# Patient Record
Sex: FEMALE | Race: WHITE | Employment: OTHER | URBAN - METROPOLITAN AREA
[De-identification: names, ages, dates, MRNs, and addresses within clinical notes are randomized per-mention and may not be internally consistent; named-entity substitution may affect disease eponyms.]

---

## 2017-03-28 ENCOUNTER — OFFICE VISIT (OUTPATIENT)
Dept: CARDIOLOGY CLINIC | Age: 77
End: 2017-03-28

## 2017-03-28 VITALS
DIASTOLIC BLOOD PRESSURE: 80 MMHG | OXYGEN SATURATION: 97 % | SYSTOLIC BLOOD PRESSURE: 130 MMHG | BODY MASS INDEX: 33.97 KG/M2 | HEIGHT: 64 IN | HEART RATE: 80 BPM | WEIGHT: 199 LBS

## 2017-03-28 DIAGNOSIS — I10 ESSENTIAL HYPERTENSION: ICD-10-CM

## 2017-03-28 DIAGNOSIS — R94.39 ABNORMAL STRESS TEST: ICD-10-CM

## 2017-03-28 DIAGNOSIS — I25.10 CORONARY ARTERY DISEASE INVOLVING NATIVE CORONARY ARTERY OF NATIVE HEART WITHOUT ANGINA PECTORIS: Primary | ICD-10-CM

## 2017-03-28 DIAGNOSIS — R06.02 SHORTNESS OF BREATH: ICD-10-CM

## 2017-03-28 DIAGNOSIS — E78.00 PURE HYPERCHOLESTEROLEMIA: ICD-10-CM

## 2017-03-28 PROCEDURE — 99215 OFFICE O/P EST HI 40 MIN: CPT | Performed by: INTERNAL MEDICINE

## 2017-03-28 ASSESSMENT — ENCOUNTER SYMPTOMS
WHEEZING: 0
COUGH: 1
COLOR CHANGE: 0
ABDOMINAL PAIN: 0
BLOOD IN STOOL: 0
EYE REDNESS: 0
SHORTNESS OF BREATH: 0
EYE PAIN: 0
CHEST TIGHTNESS: 0

## 2017-04-10 ENCOUNTER — HOSPITAL ENCOUNTER (OUTPATIENT)
Dept: NON INVASIVE DIAGNOSTICS | Age: 77
Discharge: HOME | End: 2017-04-10
Attending: INTERNAL MEDICINE | Admitting: INTERNAL MEDICINE

## 2017-04-10 ENCOUNTER — TELEPHONE (OUTPATIENT)
Dept: CARDIOLOGY CLINIC | Age: 77
End: 2017-04-10

## 2017-04-10 DIAGNOSIS — I10 ESSENTIAL HYPERTENSION: ICD-10-CM

## 2017-04-10 DIAGNOSIS — R94.39 ABNORMAL STRESS TEST: ICD-10-CM

## 2017-04-10 DIAGNOSIS — I25.10 CORONARY ARTERY DISEASE INVOLVING NATIVE CORONARY ARTERY OF NATIVE HEART WITHOUT ANGINA PECTORIS: ICD-10-CM

## 2017-04-10 DIAGNOSIS — R94.39 ABNORMAL STRESS TEST: Primary | ICD-10-CM

## 2017-04-10 DIAGNOSIS — R06.02 SHORTNESS OF BREATH: ICD-10-CM

## 2017-04-10 LAB
ANION GAP SERPL CALCULATED.3IONS-SCNC: 17 MMOL/L (ref 3–16)
BUN BLDV-MCNC: 9 MG/DL (ref 7–20)
CALCIUM SERPL-MCNC: 10.2 MG/DL (ref 8.3–10.6)
CHLORIDE BLD-SCNC: 88 MMOL/L (ref 99–110)
CO2: 26 MMOL/L (ref 21–32)
CREAT SERPL-MCNC: 0.8 MG/DL (ref 0.6–1.2)
GFR AFRICAN AMERICAN: >60
GFR NON-AFRICAN AMERICAN: >60
GLUCOSE BLD-MCNC: 88 MG/DL (ref 70–99)
HCT VFR BLD CALC: 38.6 % (ref 36–48)
HEMOGLOBIN: 12.7 G/DL (ref 12–16)
INR BLD: 0.96 (ref 0.85–1.15)
LV EF: 63 %
LVEF MODALITY: NORMAL
MCH RBC QN AUTO: 29.1 PG (ref 26–34)
MCHC RBC AUTO-ENTMCNC: 33 G/DL (ref 31–36)
MCV RBC AUTO: 88.2 FL (ref 80–100)
PDW BLD-RTO: 14.7 % (ref 12.4–15.4)
PLATELET # BLD: 293 K/UL (ref 135–450)
PMV BLD AUTO: 7.6 FL (ref 5–10.5)
POTASSIUM SERPL-SCNC: 4.5 MMOL/L (ref 3.5–5.1)
PROTHROMBIN TIME: 10.9 SEC (ref 9.6–13)
RBC # BLD: 4.37 M/UL (ref 4–5.2)
SODIUM BLD-SCNC: 131 MMOL/L (ref 136–145)
WBC # BLD: 12.2 K/UL (ref 4–11)

## 2017-04-11 RX ORDER — SODIUM CHLORIDE 0.9 % (FLUSH) 0.9 %
10 SYRINGE (ML) INJECTION PRN
Status: CANCELLED | OUTPATIENT
Start: 2017-04-12

## 2017-04-11 RX ORDER — SODIUM CHLORIDE 0.9 % (FLUSH) 0.9 %
10 SYRINGE (ML) INJECTION EVERY 12 HOURS SCHEDULED
Status: CANCELLED | OUTPATIENT
Start: 2017-04-12

## 2017-04-12 ENCOUNTER — HOSPITAL ENCOUNTER (OUTPATIENT)
Dept: CARDIAC CATH/INVASIVE PROCEDURES | Age: 77
Discharge: HOME | End: 2017-04-12
Attending: INTERNAL MEDICINE | Admitting: INTERNAL MEDICINE

## 2017-04-12 VITALS — HEIGHT: 64 IN | BODY MASS INDEX: 33.29 KG/M2 | WEIGHT: 195 LBS

## 2017-04-12 DIAGNOSIS — R06.02 SHORTNESS OF BREATH: ICD-10-CM

## 2017-04-12 RX ORDER — SODIUM CHLORIDE 0.9 % (FLUSH) 0.9 %
10 SYRINGE (ML) INJECTION PRN
Status: CANCELLED | OUTPATIENT
Start: 2017-04-12

## 2017-04-12 RX ORDER — SODIUM CHLORIDE 0.9 % (FLUSH) 0.9 %
10 SYRINGE (ML) INJECTION EVERY 12 HOURS SCHEDULED
Status: CANCELLED | OUTPATIENT
Start: 2017-04-12

## 2017-04-12 RX ORDER — ACETAMINOPHEN 325 MG/1
650 TABLET ORAL EVERY 4 HOURS PRN
Status: DISCONTINUED | OUTPATIENT
Start: 2017-04-12 | End: 2017-04-13 | Stop reason: HOSPADM

## 2017-04-12 RX ORDER — SODIUM CHLORIDE 9 MG/ML
INJECTION, SOLUTION INTRAVENOUS CONTINUOUS
Status: DISCONTINUED | OUTPATIENT
Start: 2017-04-12 | End: 2017-04-13 | Stop reason: HOSPADM

## 2017-04-12 RX ORDER — ONDANSETRON 2 MG/ML
4 INJECTION INTRAMUSCULAR; INTRAVENOUS EVERY 6 HOURS PRN
Status: DISCONTINUED | OUTPATIENT
Start: 2017-04-12 | End: 2017-04-13 | Stop reason: HOSPADM

## 2017-04-12 RX ORDER — ASPIRIN 325 MG
325 TABLET ORAL ONCE
Status: DISCONTINUED | OUTPATIENT
Start: 2017-04-12 | End: 2017-04-13 | Stop reason: HOSPADM

## 2017-04-19 LAB
EKG ATRIAL RATE: 83 BPM
EKG DIAGNOSIS: NORMAL
EKG P AXIS: 59 DEGREES
EKG P-R INTERVAL: 162 MS
EKG Q-T INTERVAL: 374 MS
EKG QRS DURATION: 82 MS
EKG QTC CALCULATION (BAZETT): 439 MS
EKG R AXIS: 56 DEGREES
EKG T AXIS: 17 DEGREES
EKG VENTRICULAR RATE: 83 BPM

## 2017-05-02 ENCOUNTER — OFFICE VISIT (OUTPATIENT)
Dept: CARDIOLOGY CLINIC | Age: 77
End: 2017-05-02

## 2017-05-02 VITALS
DIASTOLIC BLOOD PRESSURE: 88 MMHG | HEIGHT: 64 IN | BODY MASS INDEX: 33.97 KG/M2 | OXYGEN SATURATION: 98 % | SYSTOLIC BLOOD PRESSURE: 138 MMHG | HEART RATE: 92 BPM | WEIGHT: 199 LBS

## 2017-05-02 DIAGNOSIS — E78.00 PURE HYPERCHOLESTEROLEMIA: ICD-10-CM

## 2017-05-02 DIAGNOSIS — I10 ESSENTIAL HYPERTENSION: ICD-10-CM

## 2017-05-02 DIAGNOSIS — I25.10 CORONARY ARTERY DISEASE INVOLVING NATIVE CORONARY ARTERY OF NATIVE HEART WITHOUT ANGINA PECTORIS: Primary | ICD-10-CM

## 2017-05-02 PROCEDURE — 99214 OFFICE O/P EST MOD 30 MIN: CPT | Performed by: INTERNAL MEDICINE

## 2017-05-02 RX ORDER — EZETIMIBE AND SIMVASTATIN 10; 40 MG/1; MG/1
1 TABLET ORAL NIGHTLY
Qty: 90 TABLET | Refills: 3 | Status: SHIPPED | OUTPATIENT
Start: 2017-05-02 | End: 2017-12-14 | Stop reason: SDUPTHER

## 2017-05-02 RX ORDER — FLUTICASONE PROPIONATE 50 MCG
1 SPRAY, SUSPENSION (ML) NASAL PRN
COMMUNITY

## 2017-05-02 RX ORDER — LEVOCETIRIZINE DIHYDROCHLORIDE 5 MG/1
5 TABLET, FILM COATED ORAL NIGHTLY
COMMUNITY
End: 2018-05-31 | Stop reason: ALTCHOICE

## 2017-05-02 RX ORDER — MONTELUKAST SODIUM 10 MG/1
10 TABLET ORAL NIGHTLY
COMMUNITY
End: 2018-05-31 | Stop reason: ALTCHOICE

## 2017-05-02 ASSESSMENT — ENCOUNTER SYMPTOMS
BLOOD IN STOOL: 0
COLOR CHANGE: 0
COUGH: 0
WHEEZING: 0
ABDOMINAL PAIN: 0
CHEST TIGHTNESS: 0
EYE PAIN: 0
EYE REDNESS: 0
SHORTNESS OF BREATH: 0

## 2017-12-14 ENCOUNTER — OFFICE VISIT (OUTPATIENT)
Dept: CARDIOLOGY CLINIC | Age: 77
End: 2017-12-14

## 2017-12-14 VITALS
BODY MASS INDEX: 33.8 KG/M2 | HEART RATE: 90 BPM | DIASTOLIC BLOOD PRESSURE: 70 MMHG | HEIGHT: 64 IN | OXYGEN SATURATION: 97 % | SYSTOLIC BLOOD PRESSURE: 122 MMHG | WEIGHT: 198 LBS

## 2017-12-14 DIAGNOSIS — I10 ESSENTIAL HYPERTENSION: ICD-10-CM

## 2017-12-14 DIAGNOSIS — E78.00 PURE HYPERCHOLESTEROLEMIA: ICD-10-CM

## 2017-12-14 DIAGNOSIS — I25.10 CORONARY ARTERY DISEASE INVOLVING NATIVE CORONARY ARTERY OF NATIVE HEART WITHOUT ANGINA PECTORIS: Primary | ICD-10-CM

## 2017-12-14 DIAGNOSIS — E83.42 HYPOMAGNESEMIA: ICD-10-CM

## 2017-12-14 PROCEDURE — 1090F PRES/ABSN URINE INCON ASSESS: CPT | Performed by: INTERNAL MEDICINE

## 2017-12-14 PROCEDURE — G8427 DOCREV CUR MEDS BY ELIG CLIN: HCPCS | Performed by: INTERNAL MEDICINE

## 2017-12-14 PROCEDURE — 99214 OFFICE O/P EST MOD 30 MIN: CPT | Performed by: INTERNAL MEDICINE

## 2017-12-14 PROCEDURE — 1036F TOBACCO NON-USER: CPT | Performed by: INTERNAL MEDICINE

## 2017-12-14 PROCEDURE — 1123F ACP DISCUSS/DSCN MKR DOCD: CPT | Performed by: INTERNAL MEDICINE

## 2017-12-14 PROCEDURE — 4040F PNEUMOC VAC/ADMIN/RCVD: CPT | Performed by: INTERNAL MEDICINE

## 2017-12-14 PROCEDURE — 3017F COLORECTAL CA SCREEN DOC REV: CPT | Performed by: INTERNAL MEDICINE

## 2017-12-14 PROCEDURE — G8400 PT W/DXA NO RESULTS DOC: HCPCS | Performed by: INTERNAL MEDICINE

## 2017-12-14 PROCEDURE — G8598 ASA/ANTIPLAT THER USED: HCPCS | Performed by: INTERNAL MEDICINE

## 2017-12-14 PROCEDURE — G8484 FLU IMMUNIZE NO ADMIN: HCPCS | Performed by: INTERNAL MEDICINE

## 2017-12-14 PROCEDURE — G8417 CALC BMI ABV UP PARAM F/U: HCPCS | Performed by: INTERNAL MEDICINE

## 2017-12-14 RX ORDER — EZETIMIBE AND SIMVASTATIN 10; 40 MG/1; MG/1
1 TABLET ORAL NIGHTLY
Qty: 90 TABLET | Refills: 3 | Status: SHIPPED | OUTPATIENT
Start: 2017-12-14 | End: 2018-12-17

## 2017-12-14 ASSESSMENT — ENCOUNTER SYMPTOMS
BLOOD IN STOOL: 0
COLOR CHANGE: 0
EYE PAIN: 0
SHORTNESS OF BREATH: 0
CHEST TIGHTNESS: 0
EYE REDNESS: 0
ABDOMINAL PAIN: 0
COUGH: 0
WHEEZING: 0

## 2017-12-14 NOTE — PROGRESS NOTES
and vitals reviewed. Blood pressure 122/70, pulse 90, height 5' 4\" (1.626 m), weight 198 lb (89.8 kg), SpO2 97 %. Vitals:    12/14/17 1232   BP: 122/70   Pulse: 90   SpO2: 97%   Weight: 198 lb (89.8 kg)   Height: 5' 4\" (1.626 m)     Body mass index is 33.99 kg/m². Wt Readings from Last 3 Encounters:   12/14/17 198 lb (89.8 kg)   05/02/17 199 lb (90.3 kg)   04/12/17 195 lb (88.5 kg)     BP Readings from Last 3 Encounters:   12/14/17 122/70   05/02/17 138/88   03/28/17 130/80        Current Outpatient Prescriptions   Medication Sig Dispense Refill    montelukast (SINGULAIR) 10 MG tablet Take 10 mg by mouth nightly      fluticasone (FLONASE) 50 MCG/ACT nasal spray 1 spray by Nasal route daily      levocetirizine (XYZAL) 5 MG tablet Take 5 mg by mouth nightly      ezetimibe-simvastatin (VYTORIN) 10-40 MG per tablet Take 1 tablet by mouth nightly 90 tablet 3    aspirin 81 MG tablet Take 81 mg by mouth daily      budesonide (PULMICORT FLEXHALER) 180 MCG/ACT AEPB inhaler Inhale 2 puffs into the lungs as needed       albuterol sulfate HFA (VENTOLIN HFA) 108 (90 BASE) MCG/ACT inhaler Inhale 2 puffs into the lungs every 6 hours as needed for Wheezing      potassium chloride SA (K-DUR;KLOR-CON M) 10 MEQ tablet Take 3 tablets by mouth daily. 90 tablet 3    acyclovir (ZOVIRAX) 5 % ointment Apply  topically every 3 hours. Apply topically every 3 hours.  levothyroxine (SYNTHROID) 50 MCG tablet Take 75 mcg by mouth Daily       esomeprazole (NEXIUM) 40 MG capsule Take 40 mg by mouth every morning (before breakfast).  magnesium oxide (MAG-OX) 400 MG tablet Take 1 tablet by mouth 3 times daily. 90 tablet 6    triamterene-hydrochlorothiazide (MAXZIDE-25) 37.5-25 MG per tablet Take 1 tablet by mouth daily.  lorazepam (ATIVAN) 1 MG tablet Take 1 mg by mouth every 6 hours as needed.         olmesartan (BENICAR) 20 MG tablet Take 10 mg by mouth 2 times daily        No current facility-administered medications for this visit. Social History     Social History    Marital status:      Spouse name: N/A    Number of children: N/A    Years of education: N/A     Occupational History    retired      Social History Main Topics    Smoking status: Former Smoker     Packs/day: 1.50     Years: 25.00     Quit date: 10/24/1997    Smokeless tobacco: Never Used    Alcohol use No    Drug use: No    Sexual activity: Not Asked      Comment:      Other Topics Concern    None     Social History Narrative    None     Past Surgical History:   Procedure Laterality Date    APPENDECTOMY      CERVICAL POLYP REMOVAL      CORONARY ANGIOPLASTY WITH STENT PLACEMENT  2002    Prox Circ, PDA of RCA    TONSILLECTOMY      TOTAL KNEE ARTHROPLASTY Right May 2006    Dr. Brigida Gaines Left 9/14/15    Left knee     Allergies   Allergen Reactions    Atenolol     Ceclor [Cefaclor]     Ciprofloxacin Other (See Comments)     May have made her \"make foam\" from her lungs.  Clindamycin/Lincomycin     Diltiazem     Diovan [Valsartan]     Doxycycline Hives    Erythromycin     Estrogenic Substance Other (See Comments)     Made her bleed.  Lipitor     Mevacor [Lovastatin]     Prednisone     Questran     Sulfa Antibiotics     Valtrex [Valacyclovir Hcl]     Verapamil     Yellow Dyes (Non-Tartrazine) Other (See Comments)     Fast heart rate     Family History   Problem Relation Age of Onset    Arthritis Mother      Recent labs and imaging reviewed. Assessment:       CAD (coronary artery disease)- NOT ON BETA BLOCKER DUE TO DYSPNEA      s/p prox LCx and PDA of RCA stents 02. Nuc GXT 3/2017 high inferior wall ischemia, LVEF 76%. Cath 4/2017 CORONARY ANATOMY:  1. Left main trunk is patent. 2. The LAD stent in the mid LAD has mild in-stent restenosis about 30%. The  first diagonal branch, which is jailed by the stent, has 80% stenosis in the  branch.  The rest of the LAD is patent. 3. Left circumflex artery: The stent in the proximal left circumflex artery  is widely patent. It is a codominant artery. 4. Right coronary artery is a dominant artery. The stent in the distal RCA  is widely patent and the stent in the PDA is widely patent. There is mild  20-30% stenosis in the mid RCA. LV gram showed EF around 60-65%. There is no  wall motion abnormality.     IMPRESSION:  1. Stents in the distal RCA and PDA are widely patent. 2. Stent in the proximal circumflex artery is widely patent. 3. Stent in the LAD has mild in-stent stenosis.       Hyperlipidemia       LDL 47    HTN (hypertension)      Controlled      Shortness of breath      Worsened by pulmicort inhaler per pt. R/o ischemia.  GERD (gastroesophageal reflux disease)      managed by PCP    Hypomagnesemia- secondary to Maxzide        on supplements        Hypokalemia on supplements. .Hyponatremia. Hx. Monitored by PCP        Plan:      Clinically stable. No evidence of CHF. No angina. Continue current medical therapy and ongoing risk factor modification. Fasting lipid profile, CMP, Mg level prior to next visit.

## 2017-12-14 NOTE — LETTER
[address] [facility] - [facility] [address]  [address] [place] [address]  [address] [address]  Phone: [phone]  Fax: [phone]    Sachin Butler MD        December 18, 2017     Perico Lopez    Patient: Umm Lewis  MR Number: [id number]  YOB: 1940  Date of Visit: 12/14/2017    Dear Dr. Farfan Fleeting:    HPI Umm Lewis denies chest pain, palpitations, dizziness, syncope, leg swelling and increased dyspnea. She states that she is recovering from a cold. Assessment:       CAD (coronary artery disease)- NOT ON BETA BLOCKER DUE TO DYSPNEA      s/p prox LCx and PDA of RCA stents 02. Nuc GXT 3/2017 high inferior wall ischemia, LVEF 76%. Cath 4/2017 CORONARY ANATOMY:  1. Left main trunk is patent. 2. The LAD stent in the mid LAD has mild in-stent restenosis about 30%. The  first diagonal branch, which is jailed by the stent, has 80% stenosis in the  branch. The rest of the LAD is patent. 3. Left circumflex artery: The stent in the proximal left circumflex artery  is widely patent. It is a codominant artery. 4. Right coronary artery is a dominant artery. The stent in the distal RCA  is widely patent and the stent in the PDA is widely patent. There is mild  20-30% stenosis in the mid RCA. LV gram showed EF around 60-65%. There is no  wall motion abnormality.     IMPRESSION:  1. Stents in the distal RCA and PDA are widely patent. 2. Stent in the proximal circumflex artery is widely patent. 3. Stent in the LAD has mild in-stent stenosis.       Hyperlipidemia       LDL 47    HTN (hypertension)      Controlled      Shortness of breath      Worsened by pulmicort inhaler per pt. R/o ischemia.  GERD (gastroesophageal reflux disease)      managed by PCP    Hypomagnesemia- secondary to Maxzide        on supplements        Hypokalemia on supplements. .Hyponatremia. Hx.  Monitored by PCP        Plan: Clinically stable. No evidence of CHF. No angina. Continue current medical therapy and ongoing risk factor modification. Fasting lipid profile, CMP, Mg level prior to next visit. If you have questions, please do not hesitate to call me. I look forward to following Jeromy Ventura along with you.     Sincerely,        Se Batres MD

## 2017-12-18 NOTE — COMMUNICATION BODY
HPI Chato Coello denies chest pain, palpitations, dizziness, syncope, leg swelling and increased dyspnea. She states that she is recovering from a cold. Assessment:       CAD (coronary artery disease)- NOT ON BETA BLOCKER DUE TO DYSPNEA      s/p prox LCx and PDA of RCA stents 02. Nuc GXT 3/2017 high inferior wall ischemia, LVEF 76%. Cath 4/2017 CORONARY ANATOMY:  1. Left main trunk is patent. 2. The LAD stent in the mid LAD has mild in-stent restenosis about 30%. The  first diagonal branch, which is jailed by the stent, has 80% stenosis in the  branch. The rest of the LAD is patent. 3. Left circumflex artery: The stent in the proximal left circumflex artery  is widely patent. It is a codominant artery. 4. Right coronary artery is a dominant artery. The stent in the distal RCA  is widely patent and the stent in the PDA is widely patent. There is mild  20-30% stenosis in the mid RCA. LV gram showed EF around 60-65%. There is no  wall motion abnormality.     IMPRESSION:  1. Stents in the distal RCA and PDA are widely patent. 2. Stent in the proximal circumflex artery is widely patent. 3. Stent in the LAD has mild in-stent stenosis.       Hyperlipidemia       LDL 47    HTN (hypertension)      Controlled      Shortness of breath      Worsened by pulmicort inhaler per pt. R/o ischemia.  GERD (gastroesophageal reflux disease)      managed by PCP    Hypomagnesemia- secondary to Maxzide        on supplements        Hypokalemia on supplements. .Hyponatremia. Hx. Monitored by PCP        Plan:      Clinically stable. No evidence of CHF. No angina. Continue current medical therapy and ongoing risk factor modification. Fasting lipid profile, CMP, Mg level prior to next visit.

## 2017-12-20 ENCOUNTER — HOSPITAL ENCOUNTER (OUTPATIENT)
Dept: MAMMOGRAPHY | Age: 77
Discharge: HOME | End: 2017-12-20
Attending: FAMILY MEDICINE | Admitting: FAMILY MEDICINE

## 2017-12-20 DIAGNOSIS — Z12.31 VISIT FOR SCREENING MAMMOGRAM: ICD-10-CM

## 2018-03-23 ENCOUNTER — SURG/PROC ORDERS (OUTPATIENT)
Dept: OPHTHALMOLOGY | Age: 78
End: 2018-03-23

## 2018-03-23 RX ORDER — TETRACAINE HYDROCHLORIDE 5 MG/ML
1 SOLUTION OPHTHALMIC ONCE
Status: CANCELLED | OUTPATIENT
Start: 2018-03-23 | End: 2018-03-23

## 2018-03-23 RX ORDER — SODIUM CHLORIDE 0.9 % (FLUSH) 0.9 %
10 SYRINGE (ML) INJECTION PRN
Status: CANCELLED | OUTPATIENT
Start: 2018-03-23

## 2018-03-23 RX ORDER — SODIUM CHLORIDE 0.9 % (FLUSH) 0.9 %
10 SYRINGE (ML) INJECTION EVERY 12 HOURS SCHEDULED
Status: CANCELLED | OUTPATIENT
Start: 2018-03-23

## 2018-03-29 ENCOUNTER — HOSPITAL ENCOUNTER (OUTPATIENT)
Dept: SURGERY | Age: 78
Discharge: HOME | End: 2018-03-29
Attending: OPHTHALMOLOGY | Admitting: OPHTHALMOLOGY

## 2018-03-29 VITALS
DIASTOLIC BLOOD PRESSURE: 74 MMHG | TEMPERATURE: 97 F | RESPIRATION RATE: 17 BRPM | SYSTOLIC BLOOD PRESSURE: 152 MMHG | OXYGEN SATURATION: 99 % | HEART RATE: 85 BPM

## 2018-03-29 RX ORDER — SODIUM CHLORIDE 0.9 % (FLUSH) 0.9 %
10 SYRINGE (ML) INJECTION EVERY 12 HOURS SCHEDULED
Status: DISCONTINUED | OUTPATIENT
Start: 2018-03-29 | End: 2018-03-29 | Stop reason: SDUPTHER

## 2018-03-29 RX ORDER — TETRACAINE HYDROCHLORIDE 5 MG/ML
1 SOLUTION OPHTHALMIC ONCE
Status: COMPLETED | OUTPATIENT
Start: 2018-03-29 | End: 2018-03-29

## 2018-03-29 RX ORDER — MEPERIDINE HYDROCHLORIDE 25 MG/ML
12.5 INJECTION INTRAMUSCULAR; INTRAVENOUS; SUBCUTANEOUS EVERY 5 MIN PRN
Status: DISCONTINUED | OUTPATIENT
Start: 2018-03-29 | End: 2018-03-30 | Stop reason: HOSPADM

## 2018-03-29 RX ORDER — SODIUM CHLORIDE 0.9 % (FLUSH) 0.9 %
10 SYRINGE (ML) INJECTION PRN
Status: DISCONTINUED | OUTPATIENT
Start: 2018-03-29 | End: 2018-03-29 | Stop reason: SDUPTHER

## 2018-03-29 RX ORDER — MORPHINE SULFATE 4 MG/ML
1 INJECTION, SOLUTION INTRAMUSCULAR; INTRAVENOUS EVERY 5 MIN PRN
Status: DISCONTINUED | OUTPATIENT
Start: 2018-03-29 | End: 2018-03-30 | Stop reason: HOSPADM

## 2018-03-29 RX ORDER — METOCLOPRAMIDE HYDROCHLORIDE 5 MG/ML
10 INJECTION INTRAMUSCULAR; INTRAVENOUS
Status: ACTIVE | OUTPATIENT
Start: 2018-03-29 | End: 2018-03-29

## 2018-03-29 RX ORDER — LIDOCAINE HYDROCHLORIDE 10 MG/ML
1 INJECTION, SOLUTION EPIDURAL; INFILTRATION; INTRACAUDAL; PERINEURAL
Status: ACTIVE | OUTPATIENT
Start: 2018-03-29 | End: 2018-03-29

## 2018-03-29 RX ORDER — PROMETHAZINE HYDROCHLORIDE 25 MG/ML
6.25 INJECTION, SOLUTION INTRAMUSCULAR; INTRAVENOUS
Status: ACTIVE | OUTPATIENT
Start: 2018-03-29 | End: 2018-03-29

## 2018-03-29 RX ORDER — OXYCODONE HYDROCHLORIDE 5 MG/1
10 TABLET ORAL PRN
Status: ACTIVE | OUTPATIENT
Start: 2018-03-29 | End: 2018-03-29

## 2018-03-29 RX ORDER — SODIUM CHLORIDE 9 MG/ML
INJECTION, SOLUTION INTRAVENOUS CONTINUOUS
Status: DISCONTINUED | OUTPATIENT
Start: 2018-03-29 | End: 2018-03-30 | Stop reason: HOSPADM

## 2018-03-29 RX ORDER — OXYCODONE HYDROCHLORIDE 5 MG/1
5 TABLET ORAL PRN
Status: ACTIVE | OUTPATIENT
Start: 2018-03-29 | End: 2018-03-29

## 2018-03-29 RX ORDER — SODIUM CHLORIDE 0.9 % (FLUSH) 0.9 %
10 SYRINGE (ML) INJECTION PRN
Status: DISCONTINUED | OUTPATIENT
Start: 2018-03-29 | End: 2018-03-30 | Stop reason: HOSPADM

## 2018-03-29 RX ORDER — DIPHENHYDRAMINE HYDROCHLORIDE 50 MG/ML
12.5 INJECTION INTRAMUSCULAR; INTRAVENOUS
Status: ACTIVE | OUTPATIENT
Start: 2018-03-29 | End: 2018-03-29

## 2018-03-29 RX ORDER — HYDRALAZINE HYDROCHLORIDE 20 MG/ML
5 INJECTION INTRAMUSCULAR; INTRAVENOUS EVERY 10 MIN PRN
Status: DISCONTINUED | OUTPATIENT
Start: 2018-03-29 | End: 2018-03-30 | Stop reason: HOSPADM

## 2018-03-29 RX ORDER — LABETALOL HYDROCHLORIDE 5 MG/ML
5 INJECTION, SOLUTION INTRAVENOUS EVERY 10 MIN PRN
Status: DISCONTINUED | OUTPATIENT
Start: 2018-03-29 | End: 2018-03-30 | Stop reason: HOSPADM

## 2018-03-29 RX ORDER — SODIUM CHLORIDE 0.9 % (FLUSH) 0.9 %
10 SYRINGE (ML) INJECTION EVERY 12 HOURS SCHEDULED
Status: DISCONTINUED | OUTPATIENT
Start: 2018-03-29 | End: 2018-03-30 | Stop reason: HOSPADM

## 2018-03-29 RX ADMIN — TETRACAINE HYDROCHLORIDE 1 DROP: 5 SOLUTION OPHTHALMIC at 08:24

## 2018-03-29 RX ADMIN — Medication: at 08:24

## 2018-03-29 RX ADMIN — SODIUM CHLORIDE: 9 INJECTION, SOLUTION INTRAVENOUS at 08:49

## 2018-03-29 ASSESSMENT — ENCOUNTER SYMPTOMS: SHORTNESS OF BREATH: 1

## 2018-03-29 ASSESSMENT — PAIN - FUNCTIONAL ASSESSMENT: PAIN_FUNCTIONAL_ASSESSMENT: 0-10

## 2018-03-29 ASSESSMENT — PAIN SCALES - GENERAL
PAINLEVEL_OUTOF10: 0
PAINLEVEL_OUTOF10: 0

## 2018-03-29 NOTE — ANESTHESIA PRE-OP
10 mL  10 mL Intravenous 2 times per day Tonya Mcrae MD        sodium chloride flush 0.9 % injection 10 mL  10 mL Intravenous PRN Tonya Mcrae MD        lidocaine PF 1 % injection 1 mL  1 mL Intradermal Once PRN Tonya Mcrae MD        cyclopentolate 1%, phenylephrine 2.5%, tropicamide 1%, ketorolac 0.5% ophthalmic solution  3 mL Ophthalmic See Admin Instructions Rex Workman MD   3 mL at 03/29/18 0830    HYDROmorphone (DILAUDID) injection 0.25 mg  0.25 mg Intravenous Q5 Min PRN Tonya Mcrae MD        HYDROmorphone (DILAUDID) injection 0.5 mg  0.5 mg Intravenous Q5 Min PRN Tonya Mcrae MD        morphine (PF) injection 1 mg  1 mg Intravenous Q5 Min PRN Tonya Mcrae MD        HYDROmorphone (DILAUDID) injection 0.5 mg  0.5 mg Intravenous Q5 Min PRN Tonya Mcrae MD        oxyCODONE (ROXICODONE) immediate release tablet 5 mg  5 mg Oral PRN Tonya Mcrae MD        Or    oxyCODONE (ROXICODONE) immediate release tablet 10 mg  10 mg Oral PRN Tonya Mcrae MD        diphenhydrAMINE (BENADRYL) injection 12.5 mg  12.5 mg Intravenous Once PRN Tonya Mcrae MD        metoclopramide (REGLAN) injection 10 mg  10 mg Intravenous Once PRN Tonya Mcrae MD        promethazine (PHENERGAN) injection 6.25 mg  6.25 mg Intravenous Once PRN Tonya Mcrae MD        labetalol (NORMODYNE;TRANDATE) injection 5 mg  5 mg Intravenous Q10 Min PRN Tonya Mcrae MD        hydrALAZINE (APRESOLINE) injection 5 mg  5 mg Intravenous Q10 Min PRN Tonya Mcrae MD        meperidine (DEMEROL) injection 12.5 mg  12.5 mg Intravenous Q5 Min PRN Tonya Mcrae MD           Allergies: Allergies   Allergen Reactions    Atenolol     Ceclor [Cefaclor]     Ciprofloxacin Other (See Comments)     May have made her \"make foam\" from her lungs.     Clindamycin/Lincomycin     Diltiazem     Diovan [Valsartan]     Doxycycline Hives    Erythromycin     Estrogenic Substance Other (See Comments) BP Readings from Last 3 Encounters:   03/29/18 (!) 167/83   12/14/17 122/70   05/02/17 138/88       NPO Status: Time of last liquid consumption: 2000                        Time of last solid consumption: 2000                        Date of last liquid consumption: 03/28/18                        Date of last solid food consumption: 03/28/18    BMI:   Wt Readings from Last 3 Encounters:   03/20/18 195 lb (88.5 kg)   12/14/17 198 lb (89.8 kg)   05/02/17 199 lb (90.3 kg)     There is no height or weight on file to calculate BMI. Anesthesia Evaluation  Patient summary reviewed and Nursing notes reviewed no history of anesthetic complications:   Airway: Mallampati: II  TM distance: >3 FB   Neck ROM: full  Mouth opening: > = 3 FB Dental:          Pulmonary:   (+) COPD:  shortness of breath:                             Cardiovascular:    (+) hypertension:, CAD:, hyperlipidemia                  Neuro/Psych:               GI/Hepatic/Renal:   (+) GERD:,           Endo/Other:    (+) hypothyroidism::., .                 Abdominal:           Vascular:                                        Anesthesia Plan      general     ASA 1    (61-year-old female presents for phacoemulsification with intraocular lens implant of the right eye. Plan MAC with ASA standard monitors. Questions answered. Patient agreeable with anesthetic plan.  )  Induction: intravenous. Anesthetic plan and risks discussed with patient. Plan discussed with CRNA.     Attending anesthesiologist reviewed and agrees with Pre Eval content        Yanet Lawrence MD   3/29/2018

## 2018-06-05 ENCOUNTER — SURG/PROC ORDERS (OUTPATIENT)
Dept: ANESTHESIOLOGY | Age: 78
End: 2018-06-05

## 2018-06-05 RX ORDER — SODIUM CHLORIDE 0.9 % (FLUSH) 0.9 %
10 SYRINGE (ML) INJECTION PRN
Status: CANCELLED | OUTPATIENT
Start: 2018-06-05

## 2018-06-05 RX ORDER — SODIUM CHLORIDE 0.9 % (FLUSH) 0.9 %
10 SYRINGE (ML) INJECTION EVERY 12 HOURS SCHEDULED
Status: CANCELLED | OUTPATIENT
Start: 2018-06-05

## 2018-06-05 RX ORDER — SODIUM CHLORIDE 9 MG/ML
INJECTION, SOLUTION INTRAVENOUS CONTINUOUS
Status: CANCELLED | OUTPATIENT
Start: 2018-06-05

## 2018-06-07 ENCOUNTER — SURG/PROC ORDERS (OUTPATIENT)
Dept: OPHTHALMOLOGY | Age: 78
End: 2018-06-07

## 2018-06-07 RX ORDER — MORPHINE SULFATE 4 MG/ML
2 INJECTION, SOLUTION INTRAMUSCULAR; INTRAVENOUS EVERY 5 MIN PRN
Status: DISCONTINUED | OUTPATIENT
Start: 2018-06-07 | End: 2018-06-09 | Stop reason: HOSPADM

## 2018-06-07 RX ORDER — ONDANSETRON 2 MG/ML
4 INJECTION INTRAMUSCULAR; INTRAVENOUS
Status: ACTIVE | OUTPATIENT
Start: 2018-06-07 | End: 2018-06-07

## 2018-06-07 RX ORDER — FENTANYL CITRATE 50 UG/ML
25 INJECTION, SOLUTION INTRAMUSCULAR; INTRAVENOUS EVERY 5 MIN PRN
Status: DISCONTINUED | OUTPATIENT
Start: 2018-06-07 | End: 2018-06-09 | Stop reason: HOSPADM

## 2018-06-07 RX ORDER — SODIUM CHLORIDE 0.9 % (FLUSH) 0.9 %
10 SYRINGE (ML) INJECTION PRN
Status: CANCELLED | OUTPATIENT
Start: 2018-06-07

## 2018-06-07 RX ORDER — OXYCODONE HYDROCHLORIDE AND ACETAMINOPHEN 5; 325 MG/1; MG/1
2 TABLET ORAL PRN
Status: ACTIVE | OUTPATIENT
Start: 2018-06-07 | End: 2018-06-07

## 2018-06-07 RX ORDER — OXYCODONE HYDROCHLORIDE AND ACETAMINOPHEN 5; 325 MG/1; MG/1
1 TABLET ORAL PRN
Status: ACTIVE | OUTPATIENT
Start: 2018-06-07 | End: 2018-06-07

## 2018-06-07 RX ORDER — TETRACAINE HYDROCHLORIDE 5 MG/ML
1 SOLUTION OPHTHALMIC ONCE
Status: CANCELLED | OUTPATIENT
Start: 2018-06-07 | End: 2018-06-07

## 2018-06-07 RX ORDER — FENTANYL CITRATE 50 UG/ML
50 INJECTION, SOLUTION INTRAMUSCULAR; INTRAVENOUS EVERY 5 MIN PRN
Status: DISCONTINUED | OUTPATIENT
Start: 2018-06-07 | End: 2018-06-09 | Stop reason: HOSPADM

## 2018-06-07 RX ORDER — MEPERIDINE HYDROCHLORIDE 25 MG/ML
12.5 INJECTION INTRAMUSCULAR; INTRAVENOUS; SUBCUTANEOUS EVERY 5 MIN PRN
Status: DISCONTINUED | OUTPATIENT
Start: 2018-06-07 | End: 2018-06-09 | Stop reason: HOSPADM

## 2018-06-07 RX ORDER — SODIUM CHLORIDE 0.9 % (FLUSH) 0.9 %
10 SYRINGE (ML) INJECTION EVERY 12 HOURS SCHEDULED
Status: CANCELLED | OUTPATIENT
Start: 2018-06-07

## 2018-06-07 RX ORDER — MORPHINE SULFATE 4 MG/ML
1 INJECTION, SOLUTION INTRAMUSCULAR; INTRAVENOUS EVERY 5 MIN PRN
Status: DISCONTINUED | OUTPATIENT
Start: 2018-06-07 | End: 2018-06-09 | Stop reason: HOSPADM

## 2018-06-07 ASSESSMENT — LIFESTYLE VARIABLES: SMOKING_STATUS: 0

## 2018-06-08 ENCOUNTER — HOSPITAL ENCOUNTER (OUTPATIENT)
Dept: SURGERY | Age: 78
Discharge: HOME | End: 2018-06-08
Attending: OPHTHALMOLOGY | Admitting: OPHTHALMOLOGY

## 2018-06-08 VITALS
RESPIRATION RATE: 23 BRPM | DIASTOLIC BLOOD PRESSURE: 76 MMHG | HEART RATE: 94 BPM | BODY MASS INDEX: 34.15 KG/M2 | SYSTOLIC BLOOD PRESSURE: 137 MMHG | WEIGHT: 200 LBS | OXYGEN SATURATION: 100 % | HEIGHT: 64 IN | TEMPERATURE: 97.6 F

## 2018-06-08 DIAGNOSIS — H25.812 COMBINED FORMS OF AGE-RELATED CATARACT OF LEFT EYE: ICD-10-CM

## 2018-06-08 RX ORDER — TETRACAINE HYDROCHLORIDE 5 MG/ML
1 SOLUTION OPHTHALMIC ONCE
Status: COMPLETED | OUTPATIENT
Start: 2018-06-08 | End: 2018-06-08

## 2018-06-08 RX ORDER — SODIUM CHLORIDE 0.9 % (FLUSH) 0.9 %
10 SYRINGE (ML) INJECTION PRN
Status: DISCONTINUED | OUTPATIENT
Start: 2018-06-08 | End: 2018-06-08 | Stop reason: SDUPTHER

## 2018-06-08 RX ORDER — SODIUM CHLORIDE 0.9 % (FLUSH) 0.9 %
10 SYRINGE (ML) INJECTION EVERY 12 HOURS SCHEDULED
Status: DISCONTINUED | OUTPATIENT
Start: 2018-06-08 | End: 2018-06-09 | Stop reason: HOSPADM

## 2018-06-08 RX ORDER — SODIUM CHLORIDE 0.9 % (FLUSH) 0.9 %
10 SYRINGE (ML) INJECTION PRN
Status: DISCONTINUED | OUTPATIENT
Start: 2018-06-08 | End: 2018-06-09 | Stop reason: HOSPADM

## 2018-06-08 RX ORDER — SODIUM CHLORIDE 0.9 % (FLUSH) 0.9 %
10 SYRINGE (ML) INJECTION EVERY 12 HOURS SCHEDULED
Status: DISCONTINUED | OUTPATIENT
Start: 2018-06-08 | End: 2018-06-08 | Stop reason: SDUPTHER

## 2018-06-08 RX ORDER — SODIUM CHLORIDE 9 MG/ML
INJECTION, SOLUTION INTRAVENOUS CONTINUOUS
Status: DISCONTINUED | OUTPATIENT
Start: 2018-06-08 | End: 2018-06-09 | Stop reason: HOSPADM

## 2018-06-08 RX ADMIN — TETRACAINE HYDROCHLORIDE 1 DROP: 5 SOLUTION OPHTHALMIC at 10:39

## 2018-06-08 RX ADMIN — Medication 0.1 ML: at 10:41

## 2018-06-08 ASSESSMENT — ENCOUNTER SYMPTOMS: SHORTNESS OF BREATH: 1

## 2018-06-08 ASSESSMENT — PAIN SCALES - GENERAL
PAINLEVEL_OUTOF10: 0
PAINLEVEL_OUTOF10: 0

## 2018-06-08 ASSESSMENT — PAIN - FUNCTIONAL ASSESSMENT: PAIN_FUNCTIONAL_ASSESSMENT: 0-10

## 2018-12-11 ASSESSMENT — ENCOUNTER SYMPTOMS
BLOOD IN STOOL: 0
NAUSEA: 0
COUGH: 0
EYE REDNESS: 0
WHEEZING: 0
SHORTNESS OF BREATH: 0

## 2018-12-11 NOTE — PROGRESS NOTES
potassium chloride (KLOR-CON M) 10 MEQ extended release tablet, Take 10 mEq by mouth 3 times daily, Disp: , Rfl:     simvastatin (ZOCOR) 40 MG tablet, Take 1 tablet by mouth nightly, Disp: 30 tablet, Rfl: 6    fluticasone (FLONASE) 50 MCG/ACT nasal spray, 1 spray by Nasal route as needed , Disp: , Rfl:     aspirin 81 MG tablet, Take 81 mg by mouth daily, Disp: , Rfl:     budesonide (PULMICORT FLEXHALER) 180 MCG/ACT AEPB inhaler, Inhale 2 puffs into the lungs as needed , Disp: , Rfl:     albuterol sulfate HFA (VENTOLIN HFA) 108 (90 BASE) MCG/ACT inhaler, Inhale 2 puffs into the lungs every 6 hours as needed for Wheezing, Disp: , Rfl:     acyclovir (ZOVIRAX) 5 % ointment, Apply topically as needed Apply topically every 3 hours. , Disp: , Rfl:     levothyroxine (SYNTHROID) 50 MCG tablet, Take 75 mcg by mouth Daily , Disp: , Rfl:     esomeprazole (NEXIUM) 40 MG capsule, Take 40 mg by mouth every morning (before breakfast). , Disp: , Rfl:     magnesium oxide (MAG-OX) 400 MG tablet, Take 1 tablet by mouth 3 times daily. , Disp: 90 tablet, Rfl: 6    triamterene-hydrochlorothiazide (MAXZIDE-25) 37.5-25 MG per tablet, Take 1 tablet by mouth daily. , Disp: , Rfl:     lorazepam (ATIVAN) 1 MG tablet, Take 1 mg by mouth every 6 hours as needed. , Disp: , Rfl:     olmesartan (BENICAR) 20 MG tablet, Take 10 mg by mouth 2 times daily , Disp: , Rfl:     Past Medical History:   Diagnosis Date    Arthritis     CAD (coronary artery disease)     s/p prox LCx and PDA of RCA stents 02.   Nuc GXT 07 normal.    COPD (chronic obstructive pulmonary disease) (HCC)     Full dentures     GERD (gastroesophageal reflux disease)     managed by PCP    [place] (hard of hearing)     wears hearing aids    HTN (hypertension)     controlled    Hyperlipidemia     rec semi annual lipids with LDL goal <70    Hypomagnesemia     improved     Shortness of breath     improved of beta blocker and diovan    Sinus infection     Thyroid disease trunk is patent. 2. The LAD stent in the mid LAD has mild in-stent restenosis about 30%. The  first diagonal branch, which is jailed by the stent, has 80% stenosis in the  branch. The rest of the LAD is patent. 3. Left circumflex artery: The stent in the proximal left circumflex artery  is widely patent. It is a codominant artery. 4. Right coronary artery is a dominant artery. The stent in the distal RCA  is widely patent and the stent in the PDA is widely patent. There is mild  20-30% stenosis in the mid RCA. LV gram showed EF around 60-65%. There is no  wall motion abnormality.     IMPRESSION:  1. Stents in the distal RCA and PDA are widely patent. 2. Stent in the proximal circumflex artery is widely patent. 3. Stent in the LAD has mild in-stent stenosis.       Hyperlipidemia       LDL 59 11/2018    HTN (hypertension)      Controlled      Shortness of breath      Worsened by pulmicort inhaler per pt.  GERD (gastroesophageal reflux disease)      managed by PCP    Hypomagnesemia- secondary to Maxzide        on supplements. Mg 1.4- PCP increased supplements per pt. Hypokalemia on supplements. K 3.8 11/2018       Hyponatremia. Hx. Monitored by PCP Na 131 11/2018. PLAN    Clinically stable. No evidence of CHF. No angina. Continue ASA and statin. Will swith Vytorin to Zocor 40mg nightly when Rx runs out due to cost. If unable to tolerate zocor (multiple medication intolerances) will resume vytorin. Risk factor modification also discussed. Fasting lipid profile, CMP,Mg prior to next visit. F/u in office 1 year    This note was scribed in the presence of the physician by Patricia Rm RN. The scribes documentation has been prepared under my direction and personally reviewed by me in its entirety. I confirm that the note above accurately reflects all work, treatment, procedures, and medical decision making performed by me.

## 2018-12-17 ENCOUNTER — OFFICE VISIT (OUTPATIENT)
Dept: CARDIOLOGY CLINIC | Age: 78
End: 2018-12-17
Payer: COMMERCIAL

## 2018-12-17 VITALS
WEIGHT: 199 LBS | SYSTOLIC BLOOD PRESSURE: 140 MMHG | HEIGHT: 64 IN | DIASTOLIC BLOOD PRESSURE: 90 MMHG | OXYGEN SATURATION: 98 % | BODY MASS INDEX: 33.97 KG/M2 | HEART RATE: 92 BPM

## 2018-12-17 DIAGNOSIS — E78.00 PURE HYPERCHOLESTEROLEMIA: ICD-10-CM

## 2018-12-17 DIAGNOSIS — E83.42 HYPOMAGNESEMIA: ICD-10-CM

## 2018-12-17 DIAGNOSIS — I10 ESSENTIAL HYPERTENSION: ICD-10-CM

## 2018-12-17 DIAGNOSIS — I25.10 CORONARY ARTERY DISEASE INVOLVING NATIVE CORONARY ARTERY OF NATIVE HEART WITHOUT ANGINA PECTORIS: Primary | ICD-10-CM

## 2018-12-17 PROCEDURE — 99214 OFFICE O/P EST MOD 30 MIN: CPT | Performed by: INTERNAL MEDICINE

## 2018-12-17 RX ORDER — POTASSIUM CHLORIDE 750 MG/1
10 TABLET, EXTENDED RELEASE ORAL 3 TIMES DAILY
COMMUNITY
End: 2022-01-12 | Stop reason: ALTCHOICE

## 2018-12-17 RX ORDER — SIMVASTATIN 40 MG
40 TABLET ORAL NIGHTLY
Qty: 90 TABLET | Refills: 3 | Status: SHIPPED | OUTPATIENT
Start: 2018-12-17 | End: 2018-12-19

## 2018-12-17 RX ORDER — SIMVASTATIN 40 MG
40 TABLET ORAL NIGHTLY
Qty: 30 TABLET | Refills: 6 | Status: SHIPPED | OUTPATIENT
Start: 2018-12-17 | End: 2018-12-17 | Stop reason: SDUPTHER

## 2018-12-17 NOTE — PATIENT INSTRUCTIONS
cookies. · Bake, broil, or steam foods. Don't ac them. · Be physically active. Get at least 30 minutes of exercise on most days of the week. Walking is a good choice. You also may want to do other activities, such as running, swimming, cycling, or playing tennis or team sports. · Stay at a healthy weight or lose weight by making the changes in eating and physical activity listed above. Losing just a small amount of weight, even 5 to 10 pounds, can reduce your risk for having a heart attack or stroke. · Do not smoke. When should you call for help? Watch closely for changes in your health, and be sure to contact your doctor if:    · You need help making lifestyle changes.     · You have questions about your medicine. Where can you learn more? Go to [email or web address] org and sign in to your [employer] account. Enter S571 in the [employer] box to learn more about \"High Cholesterol: Care Instructions. \"     If you do not have an account, please click on the \"Sign Up Now\" link. Current as of: December 6, 2017  Content Version: 11.8  © 9730-5494 [employer], [employer]. Care instructions adapted under license by [facility] ([facility]). If you have questions about a medical condition or this instruction, always ask your healthcare professional. Marisa Tidwell any warranty or liability for your use of this information. STOP VYTORIN  START ZOCOR 40MG NIGHTLY.

## 2018-12-19 RX ORDER — EZETIMIBE AND SIMVASTATIN 10; 40 MG/1; MG/1
1 TABLET ORAL NIGHTLY
COMMUNITY
End: 2019-03-19 | Stop reason: SDUPTHER

## 2018-12-19 NOTE — TELEPHONE ENCOUNTER
Patient spouse walked in office stating patient wishes to continue Vytorin 10/40 even though insurance was wanting switched to simvastatin. They said they will just pay out of pocket for it. [facility] updated.

## 2018-12-21 ENCOUNTER — HOSPITAL ENCOUNTER (OUTPATIENT)
Dept: MAMMOGRAPHY | Age: 78
Discharge: HOME | End: 2018-12-26
Payer: COMMERCIAL

## 2018-12-21 DIAGNOSIS — Z12.31 VISIT FOR SCREENING MAMMOGRAM: ICD-10-CM

## 2018-12-21 PROCEDURE — 77067 SCR MAMMO BI INCL CAD: CPT

## 2019-03-19 RX ORDER — EZETIMIBE AND SIMVASTATIN 10; 40 MG/1; MG/1
1 TABLET ORAL NIGHTLY
Qty: 90 TABLET | Refills: 3 | Status: SHIPPED | OUTPATIENT
Start: 2019-03-19 | End: 2019-12-02 | Stop reason: SDUPTHER

## 2019-12-02 ENCOUNTER — OFFICE VISIT (OUTPATIENT)
Dept: CARDIOLOGY CLINIC | Age: 79
End: 2019-12-02
Payer: COMMERCIAL

## 2019-12-02 VITALS
HEIGHT: 64 IN | HEART RATE: 96 BPM | WEIGHT: 194.2 LBS | OXYGEN SATURATION: 98 % | SYSTOLIC BLOOD PRESSURE: 118 MMHG | DIASTOLIC BLOOD PRESSURE: 78 MMHG | BODY MASS INDEX: 33.16 KG/M2

## 2019-12-02 DIAGNOSIS — E78.00 PURE HYPERCHOLESTEROLEMIA: ICD-10-CM

## 2019-12-02 DIAGNOSIS — I10 ESSENTIAL HYPERTENSION: ICD-10-CM

## 2019-12-02 DIAGNOSIS — I25.10 CORONARY ARTERY DISEASE INVOLVING NATIVE CORONARY ARTERY OF NATIVE HEART WITHOUT ANGINA PECTORIS: Primary | ICD-10-CM

## 2019-12-02 DIAGNOSIS — E83.42 HYPOMAGNESEMIA: ICD-10-CM

## 2019-12-02 PROCEDURE — 99214 OFFICE O/P EST MOD 30 MIN: CPT | Performed by: INTERNAL MEDICINE

## 2019-12-02 PROCEDURE — 93000 ELECTROCARDIOGRAM COMPLETE: CPT | Performed by: INTERNAL MEDICINE

## 2019-12-02 RX ORDER — EZETIMIBE AND SIMVASTATIN 10; 40 MG/1; MG/1
1 TABLET ORAL NIGHTLY
Qty: 90 TABLET | Refills: 3 | Status: SHIPPED | OUTPATIENT
Start: 2019-12-02 | End: 2020-12-09 | Stop reason: SDUPTHER

## 2019-12-02 ASSESSMENT — ENCOUNTER SYMPTOMS
NAUSEA: 0
COUGH: 0
EYE REDNESS: 0
SHORTNESS OF BREATH: 0
BLOOD IN STOOL: 0
WHEEZING: 0

## 2019-12-30 ENCOUNTER — HOSPITAL ENCOUNTER (OUTPATIENT)
Dept: MAMMOGRAPHY | Age: 79
Discharge: HOME | End: 2019-12-30
Payer: COMMERCIAL

## 2019-12-30 DIAGNOSIS — Z12.31 VISIT FOR SCREENING MAMMOGRAM: ICD-10-CM

## 2019-12-30 PROCEDURE — 77067 SCR MAMMO BI INCL CAD: CPT

## 2020-10-02 ENCOUNTER — TELEPHONE (OUTPATIENT)
Dept: CARDIOLOGY CLINIC | Age: 80
End: 2020-10-02

## 2020-11-26 PROBLEM — E78.00 PURE HYPERCHOLESTEROLEMIA: Status: ACTIVE | Noted: 2020-11-26

## 2020-11-26 ASSESSMENT — ENCOUNTER SYMPTOMS
EYE REDNESS: 0
COUGH: 0
NAUSEA: 0
WHEEZING: 0
SHORTNESS OF BREATH: 0
BLOOD IN STOOL: 0

## 2020-11-26 NOTE — PROGRESS NOTES
Rosanna Graham is a 68 y.o. female    Reason for Visit: yearly f/u CAD                    HPI  Rosanna Graham denies chest pain, palpitations, dizziness, syncope, leg swelling and worsening chronic OVIEDO. She states that she is feeling well. Her  accompanies her today. Review of Systems   Constitutional: Negative for chills, diaphoresis and fever. HENT: Negative for congestion, nosebleeds and tinnitus. [place]   Eyes: Negative for redness. Respiratory: Negative for cough, shortness of breath and wheezing. Cardiovascular: Negative for chest pain, palpitations and leg swelling. Gastrointestinal: Negative for blood in stool and nausea. Genitourinary: Negative for dysuria and hematuria. Musculoskeletal: Negative for myalgias and neck pain. Neurological: Negative for dizziness. Hematological: Does not bruise/bleed easily. Physical Exam  Vitals signs and nursing note reviewed. Constitutional:       Appearance: She is well-developed. Comments: Obese   HENT:      Head: Normocephalic and atraumatic. Eyes:      Conjunctiva/sclera: Conjunctivae normal.   Neck:      Musculoskeletal: Normal range of motion and neck supple. Cardiovascular:      Rate and Rhythm: Normal rate and regular rhythm. Heart sounds: S1 normal and S2 normal. No murmur. No gallop. Pulmonary:      Effort: Pulmonary effort is normal.      Breath sounds: Normal breath sounds. Abdominal:      General: Bowel sounds are normal.      Palpations: Abdomen is soft. Musculoskeletal: Normal range of motion. Skin:     General: Skin is warm and dry. Neurological:      Mental Status: She is alert and oriented to person, place, and time.    Psychiatric:         Behavior: Behavior normal.       Wt Readings from Last 3 Encounters:   12/09/20 192 lb (87.1 kg)   12/02/19 194 lb 3.2 oz (88.1 kg)   12/17/18 199 lb (90.3 kg)     BP Readings from Last 3 Encounters:   12/09/20 135/80   12/02/19 118/78   12/17/18 (!) 140/90     Pulse Readings from Last 3 Encounters:   12/09/20 103   12/02/19 96   12/17/18 92         Current Outpatient Medications:     ezetimibe-simvastatin (VYTORIN) 10-40 MG per tablet, Take 1 tablet by mouth nightly, Disp: 90 tablet, Rfl: 3    potassium chloride (KLOR-CON M) 10 MEQ extended release tablet, Take 10 mEq by mouth 3 times daily, Disp: , Rfl:     fluticasone (FLONASE) 50 MCG/ACT nasal spray, 1 spray by Nasal route as needed , Disp: , Rfl:     aspirin 81 MG tablet, Take 81 mg by mouth daily, Disp: , Rfl:     budesonide (PULMICORT FLEXHALER) 180 MCG/ACT AEPB inhaler, Inhale 2 puffs into the lungs as needed , Disp: , Rfl:     albuterol sulfate HFA (VENTOLIN HFA) 108 (90 BASE) MCG/ACT inhaler, Inhale 2 puffs into the lungs every 6 hours as needed for Wheezing, Disp: , Rfl:     acyclovir (ZOVIRAX) 5 % ointment, Apply topically as needed Apply topically every 3 hours. , Disp: , Rfl:     levothyroxine (SYNTHROID) 50 MCG tablet, Take 75 mcg by mouth Daily , Disp: , Rfl:     esomeprazole (NEXIUM) 40 MG capsule, Take 20 mg by mouth every morning (before breakfast) , Disp: , Rfl:     magnesium oxide (MAG-OX) 400 MG tablet, Take 1 tablet by mouth 3 times daily. (Patient taking differently: Take 400 mg by mouth 4 times daily ), Disp: 90 tablet, Rfl: 6    triamterene-hydrochlorothiazide (MAXZIDE-25) 37.5-25 MG per tablet, Take 1 tablet by mouth daily. , Disp: , Rfl:     lorazepam (ATIVAN) 1 MG tablet, Take 1 mg by mouth every 6 hours as needed. , Disp: , Rfl:     olmesartan (BENICAR) 20 MG tablet, Take 10 mg by mouth 2 times daily , Disp: , Rfl:     Past Medical History:   Diagnosis Date    Arthritis     CAD (coronary artery disease)     s/p prox LCx and PDA of RCA stents 02.   Nuc GXT 07 normal.    COPD (chronic obstructive pulmonary disease) (HCC)     Full dentures     GERD (gastroesophageal reflux disease)     managed by PCP    [place] (hard of hearing)     wears hearing aids    HTN (hypertension)     controlled    Hyperlipidemia     rec semi annual lipids with LDL goal <70    Hypomagnesemia     improved     Shortness of breath     improved of beta blocker and diovan    Sinus infection     Thyroid disease        Social History     Socioeconomic History    Marital status:      Spouse name: None    Number of children: None    Years of education: None    Highest education level: None   Occupational History    Occupation: retired   Social Needs    Financial resource strain: None    Food insecurity     Worry: None     Inability: None    Transportation needs     Medical: None     Non-medical: None   Tobacco Use    Smoking status: Former Smoker     Packs/day: 1.50     Years: 25.00     Pack years: 37.50     Last attempt to quit: 10/24/1997     Years since quittin.1    Smokeless tobacco: Never Used   Substance and Sexual Activity    Alcohol use: No     Alcohol/week: 0.0 standard drinks    Drug use: No    Sexual activity: None     Comment:    Lifestyle    Physical activity     Days per week: None     Minutes per session: None    Stress: None   Relationships    Social connections     Talks on phone: None     Gets together: None     Attends [employer] service: None     Active member of club or organization: None     Attends meetings of clubs or organizations: None     Relationship status: None    Intimate partner violence     Fear of current or ex partner: None     Emotionally abused: None     Physically abused: None     Forced sexual activity: None   Other Topics Concern    None   Social History Narrative    None       Past Surgical History:   Procedure Laterality Date    APPENDECTOMY      CERVICAL POLYP REMOVAL      COLONOSCOPY      CORONARY ANGIOPLASTY WITH STENT PLACEMENT      Prox Circ, PDA of RCA stents X 3    JOINT REPLACEMENT      PILONIDAL CYST DRAINAGE      X 3    SINUS SURGERY      Window surgery    TONSILLECTOMY      TOTAL KNEE ARTHROPLASTY Right May 2006    Dr. Mendy Woodall Left 9/14/15    Left knee       Family History   Problem Relation Age of Onset    Arthritis Mother        Allergies   Allergen Reactions    Ceclor [Cefaclor] Anaphylaxis    Diovan [Valsartan] Shortness Of Breath    Prednisone Shortness Of Breath    Atenolol     Ciprofloxacin Other (See Comments)     May have made her \"make foam\" from her lungs.  Clindamycin/Lincomycin Swelling    Diltiazem     Doxycycline Hives    Erythromycin Swelling    Estrogenic Substance Other (See Comments)     Made her bleed.  Lipitor Other (See Comments)     Stomach pain    Mevacor [Lovastatin]     Questran     Sulfa Antibiotics     Valtrex [Valacyclovir Hcl]     Verapamil     Yellow Dyes (Non-Tartrazine) Other (See Comments)     Fast heart rate       Recent Labs and Imaging reviewed    Assessment     CAD (coronary artery disease)- NOT ON BETA BLOCKER DUE TO DYSPNEA. Allergy to yellow dye in medications. s/p prox LCx and PDA of RCA stents 02. Nuc GXT 3/2017 high inferior wall ischemia, LVEF 76%. Cath 4/2017 CORONARY ANATOMY:  1. Left main trunk is patent. 2. The LAD stent in the mid LAD has mild in-stent restenosis about 30%. The  first diagonal branch, which is jailed by the stent, has 80% stenosis in the  branch. The rest of the LAD is patent. 3. Left circumflex artery: The stent in the proximal left circumflex artery  is widely patent. It is a codominant artery. 4. Right coronary artery is a dominant artery. The stent in the distal RCA  is widely patent and the stent in the PDA is widely patent. There is mild  20-30% stenosis in the mid RCA. LV gram showed EF around 60-65%. There is no  wall motion abnormality.     IMPRESSION:  1. Stents in the distal RCA and PDA are widely patent. 2. Stent in the proximal circumflex artery is widely patent. 3. Stent in the LAD has mild in-stent stenosis.     EKG 12/2019 NSR.       Hyperlipidemia       LDL 68 9/2020. Taking statin.  HTN (hypertension)      Controlled        Shortness of breath      Worsened by pulmicort inhaler per pt.  GERD (gastroesophageal reflux disease)      managed by PCP          Hypomagnesemia. Taking supplements. Mg 1.7 9/2020      PLAN    Clinically stable. No evidence of CHF. No angina. HTN controlled. Continue current medical therapy. Risk factor modification also discussed. Fasting lipid profile, CMP, Mg  prior to next visit. F/u in office 1 year    This note was scribed in the presence of the physician by Gonzalo Stephenson RN. The scribes documentation has been prepared under my direction and personally reviewed by me in its entirety. I confirm that the note above accurately reflects all work, treatment, procedures, and medical decision making performed by me.

## 2020-12-09 ENCOUNTER — OFFICE VISIT (OUTPATIENT)
Dept: CARDIOLOGY CLINIC | Age: 80
End: 2020-12-09
Payer: COMMERCIAL

## 2020-12-09 VITALS
WEIGHT: 192 LBS | BODY MASS INDEX: 32.78 KG/M2 | DIASTOLIC BLOOD PRESSURE: 80 MMHG | HEART RATE: 103 BPM | HEIGHT: 64 IN | OXYGEN SATURATION: 97 % | SYSTOLIC BLOOD PRESSURE: 135 MMHG

## 2020-12-09 PROCEDURE — 99214 OFFICE O/P EST MOD 30 MIN: CPT | Performed by: INTERNAL MEDICINE

## 2020-12-09 RX ORDER — EZETIMIBE AND SIMVASTATIN 10; 40 MG/1; MG/1
1 TABLET ORAL NIGHTLY
Qty: 90 TABLET | Refills: 3 | Status: SHIPPED | OUTPATIENT
Start: 2020-12-09

## 2020-12-09 NOTE — LETTER
[facility] - [address] [place] [address] [address]  [address] [id number]  Phone: [phone]  Fax: [phone]    Roxana Menjivar MD        December 15, 2020     [facility]    Patient: Lorena Sargent  MR Number: <[id number]>  YOB: 1940  Date of Visit: 12/9/2020    Dear Dr. Franco Tajik:    Thank you for your referral. Progress note attached in visit summary. If you have questions, please do not hesitate to call me. I look forward to following [place] [facility] along with you.     Sincerely,        Roxana Menjivar MD